# Patient Record
Sex: FEMALE | Race: WHITE | Employment: OTHER | URBAN - METROPOLITAN AREA
[De-identification: names, ages, dates, MRNs, and addresses within clinical notes are randomized per-mention and may not be internally consistent; named-entity substitution may affect disease eponyms.]

---

## 2020-07-08 ENCOUNTER — VIRTUAL VISIT (OUTPATIENT)
Dept: ENDOCRINOLOGY | Age: 65
End: 2020-07-08

## 2020-07-08 DIAGNOSIS — R53.82 CHRONIC FATIGUE: Primary | ICD-10-CM

## 2020-07-08 DIAGNOSIS — L68.0 HIRSUTISM: ICD-10-CM

## 2020-07-08 RX ORDER — MELOXICAM 15 MG/1
TABLET ORAL
COMMUNITY
Start: 2020-05-28

## 2020-07-08 RX ORDER — VENLAFAXINE HYDROCHLORIDE 75 MG/1
CAPSULE, EXTENDED RELEASE ORAL
COMMUNITY
Start: 2020-05-21

## 2020-07-08 RX ORDER — OMEPRAZOLE 40 MG/1
CAPSULE, DELAYED RELEASE ORAL
COMMUNITY
Start: 2020-05-28

## 2020-07-08 RX ORDER — ACETAMINOPHEN 500 MG
4000 TABLET ORAL DAILY
COMMUNITY

## 2020-07-08 RX ORDER — FAMOTIDINE 20 MG/1
TABLET, FILM COATED ORAL
COMMUNITY
Start: 2020-05-26

## 2020-07-08 NOTE — PROGRESS NOTES
**DUE TO PANDEMIC AND HEALTH CONCERNS IN THE COMMUNITY, THIS PATIENT WAS EITHER ILL OR FOUND TO BE HIGH RISK FOR IN-PERSON EVALUATION WITHIN THE CLINIC. THE FOLLOWING IS A VIRTUAL TELEMEDICINE VIDEO ENCOUNTER VIA [employer], TO WHICH THE PATIENT AGREED. THE PURPOSE IS TO LIMIT INTERRUPTIONS IN HEALTHCARE AND TO PROVIDE FOR ONGOING URGENT NEEDS UNDER THE CURRENT CONDITIONS. CONSULTATION REQUESTED BY: Self Referred, PCP is  Arianna Montero MD     REASON FOR CONSULT: thyroid evaluation, hirutism    CHIEF COMPLAINT: Evaluation for hypothyroidism, hirsutism    HISTORY OF PRESENT ILLNESS:   Zakia Couch is a 59 y.o. female with a PMHx as noted below who was referred to our endocrinology clinic for evaluation for possible hypothyroidism and hirsutism. Thyroid:   Hx of chronic fatigue x 9 months, not improving,  Weight gain, reporting 0 pounds in the past 6 months,  Denies any dysphagia,   She is concerned of possible underlying thyroid dysfunction,  Family history: Mother was on thyroid med for a number of years,  Thyroid level checked a couple times reporting stable findings,   No records, No prior thyroid ultrasounds,    Hirsutism:   Present for about 10 years,   Reporting face/chin/around nipples/naval  Nipple and naval hair improving,   Patient has been plucking mostly, rarely shaving,   Not much of a problem on the arms,  Post menopausal, last menses estimated about 5 years ago,  Denies any hormone replacement therapy following menopause,  Hx of progestin use for a number of years prior to that however,   No major GYN surgeries but she did have her \"tubes tied\",      PAST MEDICAL/SURGICAL HISTORY:   No past medical history on file.   Past Surgical History:   Procedure Laterality Date    HX APPENDECTOMY      HX GYN      tubal ligation       ALLERGIES:   Allergies   Allergen Reactions    Ampicillin Itching    Lodine [Etodolac] Itching       MEDICATIONS ON ADMISSION:     Current Outpatient Medications:    meloxicam (MOBIC) 15 mg tablet, TK 1 T PO ONCE A DAY FOR KNEE PAIN, Disp: , Rfl:     omeprazole (PRILOSEC) 40 mg capsule, TK ONE C PO ONCE A DAY 30 MINUTES BEFORE MORNING MEAL WITH MELOXICAM, Disp: , Rfl:     venlafaxine-SR (EFFEXOR-XR) 75 mg capsule, Taking 2 caps in the PM, Disp: , Rfl:     cyanocobalamin, vitamin B-12, (VITAMIN B-12 PO), Take  by mouth., Disp: , Rfl:     cholecalciferol (VITAMIN D3) (2,000 UNITS /50 MCG) cap capsule, Take 4,000 Units by mouth daily. , Disp: , Rfl:     famotidine (PEPCID) 20 mg tablet, TK 2 TS PO HS, Disp: , Rfl:     zolpidem (AMBIEN) 5 mg tablet, Take 5 mg by mouth nightly as needed for Sleep., Disp: , Rfl:     SOCIAL HISTORY:   Social History     Socioeconomic History    Marital status:      Spouse name: Not on file    Number of children: Not on file    Years of education: Not on file    Highest education level: Not on file   Occupational History    Not on file   Social Needs    Financial resource strain: Not on file    Food insecurity     Worry: Not on file     Inability: Not on file    Transportation needs     Medical: Not on file     Non-medical: Not on file   Tobacco Use    Smoking status: Former Smoker   Substance and Sexual Activity    Alcohol use: Yes     Comment: social    Drug use: Not on file    Sexual activity: Not on file   Lifestyle    Physical activity     Days per week: Not on file     Minutes per session: Not on file    Stress: Not on file   Relationships    Social connections     Talks on phone: Not on file     Gets together: Not on file     Attends Shinto service: Not on file     Active member of club or organization: Not on file     Attends meetings of clubs or organizations: Not on file     Relationship status: Not on file    Intimate partner violence     Fear of current or ex partner: Not on file     Emotionally abused: Not on file     Physically abused: Not on file     Forced sexual activity: Not on file   Other Topics Concern  Not on file   Social History Narrative    Not on file       FAMILY HISTORY:  Family History   Problem Relation Age of Onset    Hypertension Mother     Migraines Mother     Diabetes Father     Stroke Father     Alcohol abuse Brother     Cancer Brother     Cancer Maternal Aunt     Cancer Maternal Uncle     Cancer Paternal Aunt     Alcohol abuse Maternal Grandfather        REVIEW OF SYSTEMS: Complete ROS assessed and noted for that which is described above, all else are negative. Eyes: normal  ENT: normal  CVS: normal  Resp: normal  GI: normal  : normal  GYN: normal  Endocrine: normal  Integument: normal  Musculoskeletal: normal  Neuro: normal  Psych: normal      PHYSICAL EXAMINATION:  Telemedicine Visit    GENERAL: NCAT, Appears well nourished  EYES: EOMI, non-icteric, no proptosis  Ear/Nose/Throat: NCAT, no visible inflammation or masses  CARDIOVASCULAR: no cyanosis, no visible JVD  RESPIRATORY: comfortable respirations observed, no cyanosis  MUSCULOSKELETAL: Normal ROM of upper extremities observed  SKIN: No edema, rash, or other significant changes observed  NEUROLOGIC:  AAOx3  PSYCHIATRIC: Normal affect, Normal insight and judgement    REVIEW OF LABORATORY AND RADIOLOGY DATA:   Labs and documentation have been reviewed as described above. ASSESSMENT AND PLAN:   Danyelle Esparza is a 59 y.o. female with a PMHx as noted above who was referred to our endocrinology clinic for evaluation for possible hypothyroidism and hirsutism. Evaluation for possible hypothyroidism / chronic fatigue  Hirsutism     Thyroid Health: There is a history of thyroid disorder in her mother for which she was treated, and so obtaining a thyroid panel together with thyroid autoantibodies is reasonable to assess her status and risk/prognosis. Ordered. Hirsutism: It is reassuring that some of the \"problem hair\" around the naval and nipples have thinned on their own and improved.  We will check her female hormone profile including a testosterone level to assure that there is not an over-abundance contributing to this. I did advise her that moderate thick hair on the face and certain areas can be a normal variation however and may not reflect any underlying disease process. Will advise of results. RTC: Based on findings    45 minutes spent toward telemedicine visit today of which >50% of this time was spent in counseling and coordination of care. Siri Danielson.  [address] [facility]

## 2020-07-21 LAB
ALBUMIN SERPL-MCNC: 4.7 G/DL (ref 3.8–4.8)
ALBUMIN/GLOB SERPL: 1.8 {RATIO} (ref 1.2–2.2)
ALP SERPL-CCNC: 115 IU/L (ref 39–117)
ALT SERPL-CCNC: 13 IU/L (ref 0–32)
AST SERPL-CCNC: 17 IU/L (ref 0–40)
BILIRUB SERPL-MCNC: 0.4 MG/DL (ref 0–1.2)
BUN SERPL-MCNC: 18 MG/DL (ref 8–27)
BUN/CREAT SERPL: 16 (ref 12–28)
CALCIUM SERPL-MCNC: 10.3 MG/DL (ref 8.7–10.3)
CHLORIDE SERPL-SCNC: 103 MMOL/L (ref 96–106)
CO2 SERPL-SCNC: 28 MMOL/L (ref 20–29)
CREAT SERPL-MCNC: 1.1 MG/DL (ref 0.57–1)
ESTRADIOL SERPL-MCNC: 9 PG/ML
FSH SERPL-ACNC: 63.8 MIU/ML
GLOBULIN SER CALC-MCNC: 2.6 G/DL (ref 1.5–4.5)
GLUCOSE SERPL-MCNC: 104 MG/DL (ref 65–99)
INTERPRETATION: NORMAL
LH SERPL-ACNC: 30.7 MIU/ML
POTASSIUM SERPL-SCNC: 4.8 MMOL/L (ref 3.5–5.2)
PROT SERPL-MCNC: 7.3 G/DL (ref 6–8.5)
SODIUM SERPL-SCNC: 144 MMOL/L (ref 134–144)
T4 FREE SERPL-MCNC: 0.99 NG/DL (ref 0.82–1.77)
TESTOST SERPL-MCNC: 39.9 NG/DL (ref 7–40)
THYROGLOB AB SERPL-ACNC: <1 IU/ML (ref 0–0.9)
THYROPEROXIDASE AB SERPL-ACNC: 14 IU/ML (ref 0–34)
TSH SERPL DL<=0.005 MIU/L-ACNC: 2.36 UIU/ML (ref 0.45–4.5)

## 2020-07-22 ENCOUNTER — TELEPHONE (OUTPATIENT)
Dept: ENDOCRINOLOGY | Age: 65
End: 2020-07-22

## 2020-07-22 NOTE — TELEPHONE ENCOUNTER
----- Message from Fredis Norris MD sent at 7/22/2020  3:41 PM EDT -----  Brendon Pires,   All of the patients thyroid levels are normal, with negative thyroid antibodies which is reassuring. Her female hormone panel is as expected for menopause without elevated testosterone. She will not need to follow up in the clinic,     Thanks,   Asha Garrett.  [address] [facility]  [address] [place] [address]

## 2020-07-22 NOTE — TELEPHONE ENCOUNTER
I attempted to call Ms. Elizabeth Habermann and reached a voice mail. I left a message asking her to give me a call back.   Jony Carcamo

## 2020-07-22 NOTE — PROGRESS NOTES
Monalisa Carlos,   All of the patients thyroid levels are normal, with negative thyroid antibodies which is reassuring. Her female hormone panel is as expected for menopause without elevated testosterone. She will not need to follow up in the clinic,     Thanks,   Adrienne Marino.  [address] [facility]  [address] [place] [address]

## 2020-07-24 NOTE — TELEPHONE ENCOUNTER
I attempted to call Ms. Vickie Beasley again and again reached her voice mail. I left a message relaying the information from Dr. Cherri Hightower and said to give me a call back if she had any further questions.   Sharri Baker 3/2017 sinus arrhythmia

## 2021-12-15 NOTE — PROGRESS NOTES
HPI: Flower Curry (: 1955) is a 77 y.o. female, patient, here for evaluation of the following chief complaint(s): Patient presents with complaint of clicking and locking of the right middle finger. This has been going on for 6 to 7 months. This is awakening her. No injury/trauma. She is interested in a release versus going through the process of injection. No other complaints or concerns. Finger Pain (Right middle)       Vitals:  Ht 5' 4\" (1.626 m)   Wt 220 lb (99.8 kg)   BMI 37.76 kg/m²    Body mass index is 37.76 kg/m². Allergies   Allergen Reactions    Ampicillin Itching    Band Aids [Adhesive] Itching    Lodine [Etodolac] Itching       Current Outpatient Medications   Medication Sig    methylPREDNISolone (MEDROL DOSEPACK) 4 mg tablet Per dose pack instructions    meloxicam (MOBIC) 15 mg tablet TK 1 T PO ONCE A DAY FOR KNEE PAIN    omeprazole (PRILOSEC) 40 mg capsule TK ONE C PO ONCE A DAY 30 MINUTES BEFORE MORNING MEAL WITH MELOXICAM    venlafaxine-SR (EFFEXOR-XR) 75 mg capsule Taking 2 caps in the PM    cyanocobalamin, vitamin B-12, (VITAMIN B-12 PO) Take  by mouth.  cholecalciferol (VITAMIN D3) (2,000 UNITS /50 MCG) cap capsule Take 4,000 Units by mouth daily.  famotidine (PEPCID) 20 mg tablet TK 2 TS PO HS (Patient not taking: Reported on 2021)    zolpidem (AMBIEN) 5 mg tablet Take 5 mg by mouth nightly as needed for Sleep. (Patient not taking: Reported on 2021)     No current facility-administered medications for this visit. No past medical history on file.      Past Surgical History:   Procedure Laterality Date    HX APPENDECTOMY      HX GYN      tubal ligation       Family History   Problem Relation Age of Onset    Hypertension Mother    [facility] Migraines Mother     Diabetes Father     Stroke Father     Alcohol abuse Brother     Cancer Brother     Cancer Maternal Aunt     Cancer Maternal Uncle     Cancer Paternal Aunt     Alcohol abuse Maternal Grandfather         Social History     Tobacco Use    Smoking status: Former Smoker    Smokeless tobacco: Not on file   Substance Use Topics    Alcohol use: Yes     Comment: social    Drug use: Not on file        Review of Systems    Constitutional: No fevers, chills, night sweats, excessive fatigue or weight loss. Musculoskeletal: No joint pain, swelling or redness. No decreased range of motion. Neurologic: No headache, blurred vision, and no areas of focal weakness or numbness. Normal gait. No sensory problems. Respiratory: No dyspnea on exertion, orthopnea, chest pain, cough or hemoptysis. Cardiovascular: No anginal chest pain, irregular heart beat, tachycardia, palpitations or orthopnea  Integumentary: No chronic rashes, inflammation, ulcerations, pruritus, petechiae, purpura, ecchymoses, or skin changes           Physical Exam    General: Alert, cooperative, no distress  Musculosketal: Right hand - Tenderness to palpation at the A1 pulley of the right middle finger. Palpable clicking. No reproducible locking. No ulnar subluxation of the extensor tendon appreciated. Neurologic:  CNII-XII intact, Normal strength, sensation, and reflexes throughout    Imaging:  None     ASSESSMENT/PLAN:  Below is the assessment and plan developed based on review of pertinent history, physical exam, labs, studies, and medications. Clicking and locking of the right middle finger. This has been going on for 6 to 7 months. This is awakening her. No injury/trauma. She is interested in a release versus going through the process of injection. Clinical exam is consistent with stenosing tenosynovitis of the right middle finger. Treatment options discussed with the patient. She is deferring injections and would like to schedule a trigger finger release. In the meantime, she would like to try a steroid for symptom relief.   I reviewed risks that include but are not limited to stiffness, pain, nerve or tendon damage and overall incomplete relief of pain. Arrangements can be made for this to be performed on an outpatient basis at her convenience. 1. Acquired trigger finger of right middle finger  -     methylPREDNISolone (MEDROL DOSEPACK) 4 mg tablet; Per dose pack instructions, Normal, Disp-1 Dose Pack, R-0  2. Finger pain, right      Return in about 4 weeks (around 1/13/2022). Dr. Evy Ardon was available for immediate consult during this encounter. An electronic signature was used to authenticate this note.   -- Donald Ricks PA-C

## 2021-12-16 ENCOUNTER — OFFICE VISIT (OUTPATIENT)
Dept: ORTHOPEDIC SURGERY | Age: 66
End: 2021-12-16
Payer: MEDICARE

## 2021-12-16 VITALS — BODY MASS INDEX: 37.56 KG/M2 | HEIGHT: 64 IN | WEIGHT: 220 LBS

## 2021-12-16 DIAGNOSIS — M79.644 FINGER PAIN, RIGHT: ICD-10-CM

## 2021-12-16 DIAGNOSIS — M65.331 ACQUIRED TRIGGER FINGER OF RIGHT MIDDLE FINGER: Primary | ICD-10-CM

## 2021-12-16 PROCEDURE — G8536 NO DOC ELDER MAL SCRN: HCPCS | Performed by: PHYSICIAN ASSISTANT

## 2021-12-16 PROCEDURE — G8417 CALC BMI ABV UP PARAM F/U: HCPCS | Performed by: PHYSICIAN ASSISTANT

## 2021-12-16 PROCEDURE — 3017F COLORECTAL CA SCREEN DOC REV: CPT | Performed by: PHYSICIAN ASSISTANT

## 2021-12-16 PROCEDURE — G8427 DOCREV CUR MEDS BY ELIG CLIN: HCPCS | Performed by: PHYSICIAN ASSISTANT

## 2021-12-16 PROCEDURE — 99203 OFFICE O/P NEW LOW 30 MIN: CPT | Performed by: PHYSICIAN ASSISTANT

## 2021-12-16 PROCEDURE — G8432 DEP SCR NOT DOC, RNG: HCPCS | Performed by: PHYSICIAN ASSISTANT

## 2021-12-16 PROCEDURE — G8400 PT W/DXA NO RESULTS DOC: HCPCS | Performed by: PHYSICIAN ASSISTANT

## 2021-12-16 PROCEDURE — 1090F PRES/ABSN URINE INCON ASSESS: CPT | Performed by: PHYSICIAN ASSISTANT

## 2021-12-16 PROCEDURE — 1101F PT FALLS ASSESS-DOCD LE1/YR: CPT | Performed by: PHYSICIAN ASSISTANT

## 2021-12-16 RX ORDER — METHYLPREDNISOLONE 4 MG/1
TABLET ORAL
Qty: 1 DOSE PACK | Refills: 0 | Status: SHIPPED | OUTPATIENT
Start: 2021-12-16

## 2021-12-21 DIAGNOSIS — M65.331 ACQUIRED TRIGGER FINGER OF RIGHT MIDDLE FINGER: Primary | ICD-10-CM

## 2021-12-23 DIAGNOSIS — M65.331 ACQUIRED TRIGGER FINGER OF RIGHT MIDDLE FINGER: Primary | ICD-10-CM

## 2021-12-23 RX ORDER — HYDROCODONE BITARTRATE AND ACETAMINOPHEN 5; 325 MG/1; MG/1
1 TABLET ORAL
Qty: 15 TABLET | Refills: 0 | Status: SHIPPED | OUTPATIENT
Start: 2021-12-23 | End: 2021-12-26

## 2022-01-09 NOTE — PROGRESS NOTES
HPI: Tiffanie Yañez (: 1955) is a 77 y.o. female, patient, here for evaluation of the following chief complaint(s): Patient presents with complaint of clicking and locking of the right middle finger. This has been going on for 6 to 7 months. She underwent a right middle trigger finger A1 pulley release with left ring trigger finger injection on 2021. No chief complaint on file. Vitals: There were no vitals taken for this visit. There is no height or weight on file to calculate BMI. Allergies   Allergen Reactions    Ampicillin Itching    Band Aids [Adhesive] Itching    Lodine [Etodolac] Itching       Current Outpatient Medications   Medication Sig    methylPREDNISolone (MEDROL DOSEPACK) 4 mg tablet Per dose pack instructions    famotidine (PEPCID) 20 mg tablet TK 2 TS PO HS (Patient not taking: Reported on 2021)    meloxicam (MOBIC) 15 mg tablet TK 1 T PO ONCE A DAY FOR KNEE PAIN    omeprazole (PRILOSEC) 40 mg capsule TK ONE C PO ONCE A DAY 30 MINUTES BEFORE MORNING MEAL WITH MELOXICAM    venlafaxine-SR (EFFEXOR-XR) 75 mg capsule Taking 2 caps in the PM    cyanocobalamin, vitamin B-12, (VITAMIN B-12 PO) Take  by mouth.  cholecalciferol (VITAMIN D3) (2,000 UNITS /50 MCG) cap capsule Take 4,000 Units by mouth daily.  zolpidem (AMBIEN) 5 mg tablet Take 5 mg by mouth nightly as needed for Sleep. (Patient not taking: Reported on 2021)     No current facility-administered medications for this visit. No past medical history on file.      Past Surgical History:   Procedure Laterality Date    HX APPENDECTOMY      HX GYN      tubal ligation       Family History   Problem Relation Age of Onset    Hypertension Mother    Robertha Rumps Migraines Mother     Diabetes Father     Stroke Father     Alcohol abuse Brother     Cancer Brother     Cancer Maternal Aunt     Cancer Maternal Uncle     Cancer Paternal Aunt     Alcohol abuse Maternal Grandfather         Social History     Tobacco Use    Smoking status: Former Smoker    Smokeless tobacco: Not on file   Substance Use Topics    Alcohol use: Yes     Comment: social    Drug use: Not on file        Review of Systems   All other systems reviewed and are negative. Constitutional: No fevers, chills, night sweats, excessive fatigue or weight loss. Musculoskeletal: No joint pain, swelling or redness. No decreased range of motion. Neurologic: No headache, blurred vision, and no areas of focal weakness or numbness. Normal gait. No sensory problems. Respiratory: No dyspnea on exertion, orthopnea, chest pain, cough or hemoptysis. Cardiovascular: No anginal chest pain, irregular heart beat, tachycardia, palpitations or orthopnea  Integumentary: No chronic rashes, inflammation, ulcerations, pruritus, petechiae, purpura, ecchymoses, or skin changes           Physical Exam    Overall the right hand wound is healing well. There is no redness drainage or sign of infection. Good early digital motion. No locking in either the right middle finger of the left ring finger. Imaging:  None     ASSESSMENT/PLAN:  Below is the assessment and plan developed based on review of pertinent history, physical exam, labs, studies, and medications. Clicking and locking of the right middle finger. This has been going on for 6 to 7 months. This is awakening her. No injury/trauma. She is interested in a release versus going through the process of injection. Clinical exam is consistent with stenosing tenosynovitis of the right middle finger. Treatment options discussed with the patient. She is deferring injections and would like to schedule a trigger finger release. In the meantime, she would like to try a steroid for symptom relief. She underwent a right middle trigger finger A1 pulley release with left ring finger corticosteroid injection for stenosing tenosynovitis on 12/27/2021.   She may continue with simple wound care, gentle motion and strength. Follow-up in 3 to 4 weeks. 1. Acquired trigger finger of right middle finger  2. Finger pain, right  3. Trigger ring finger of left hand  4. Status post trigger finger release      No follow-ups on file.

## 2022-01-10 ENCOUNTER — OFFICE VISIT (OUTPATIENT)
Dept: ORTHOPEDIC SURGERY | Age: 67
End: 2022-01-10
Payer: MEDICARE

## 2022-01-10 VITALS — BODY MASS INDEX: 37.56 KG/M2 | WEIGHT: 220 LBS | HEIGHT: 64 IN

## 2022-01-10 DIAGNOSIS — M79.644 FINGER PAIN, RIGHT: ICD-10-CM

## 2022-01-10 DIAGNOSIS — M65.342 TRIGGER RING FINGER OF LEFT HAND: ICD-10-CM

## 2022-01-10 DIAGNOSIS — Z98.890 STATUS POST TRIGGER FINGER RELEASE: ICD-10-CM

## 2022-01-10 DIAGNOSIS — M65.331 ACQUIRED TRIGGER FINGER OF RIGHT MIDDLE FINGER: Primary | ICD-10-CM

## 2022-01-10 PROCEDURE — 99024 POSTOP FOLLOW-UP VISIT: CPT | Performed by: ORTHOPAEDIC SURGERY

## 2022-03-18 PROBLEM — M65.331 ACQUIRED TRIGGER FINGER OF RIGHT MIDDLE FINGER: Status: ACTIVE | Noted: 2021-12-16

## 2022-03-19 PROBLEM — M79.644 FINGER PAIN, RIGHT: Status: ACTIVE | Noted: 2021-12-16

## 2023-05-17 RX ORDER — MELOXICAM 15 MG/1
TABLET ORAL
COMMUNITY
Start: 2020-05-28

## 2023-05-17 RX ORDER — OMEPRAZOLE 40 MG/1
CAPSULE, DELAYED RELEASE ORAL
COMMUNITY
Start: 2020-05-28

## 2023-05-17 RX ORDER — ZOLPIDEM TARTRATE 5 MG/1
5 TABLET ORAL NIGHTLY PRN
COMMUNITY

## 2023-05-17 RX ORDER — VENLAFAXINE HYDROCHLORIDE 75 MG/1
CAPSULE, EXTENDED RELEASE ORAL
COMMUNITY
Start: 2020-05-21

## 2023-05-17 RX ORDER — FAMOTIDINE 20 MG/1
TABLET, FILM COATED ORAL
COMMUNITY
Start: 2020-05-26

## 2023-05-17 RX ORDER — METHYLPREDNISOLONE 4 MG/1
TABLET ORAL
COMMUNITY
Start: 2021-12-16

## 2025-01-20 ENCOUNTER — OFFICE VISIT (OUTPATIENT)
Facility: CLINIC | Age: 70
End: 2025-01-20
Payer: MEDICARE

## 2025-01-20 VITALS
BODY MASS INDEX: 36.25 KG/M2 | HEART RATE: 90 BPM | SYSTOLIC BLOOD PRESSURE: 149 MMHG | WEIGHT: 212.3 LBS | RESPIRATION RATE: 14 BRPM | DIASTOLIC BLOOD PRESSURE: 91 MMHG | OXYGEN SATURATION: 95 % | HEIGHT: 64 IN

## 2025-01-20 DIAGNOSIS — R19.7 DIARRHEA OF PRESUMED INFECTIOUS ORIGIN: Primary | ICD-10-CM

## 2025-01-20 DIAGNOSIS — F32.A ANXIETY AND DEPRESSION: ICD-10-CM

## 2025-01-20 DIAGNOSIS — F41.9 ANXIETY AND DEPRESSION: ICD-10-CM

## 2025-01-20 PROBLEM — M79.644 FINGER PAIN, RIGHT: Status: RESOLVED | Noted: 2021-12-16 | Resolved: 2025-01-20

## 2025-01-20 PROCEDURE — 1126F AMNT PAIN NOTED NONE PRSNT: CPT | Performed by: STUDENT IN AN ORGANIZED HEALTH CARE EDUCATION/TRAINING PROGRAM

## 2025-01-20 PROCEDURE — 1123F ACP DISCUSS/DSCN MKR DOCD: CPT | Performed by: STUDENT IN AN ORGANIZED HEALTH CARE EDUCATION/TRAINING PROGRAM

## 2025-01-20 PROCEDURE — G8400 PT W/DXA NO RESULTS DOC: HCPCS | Performed by: STUDENT IN AN ORGANIZED HEALTH CARE EDUCATION/TRAINING PROGRAM

## 2025-01-20 PROCEDURE — G8427 DOCREV CUR MEDS BY ELIG CLIN: HCPCS | Performed by: STUDENT IN AN ORGANIZED HEALTH CARE EDUCATION/TRAINING PROGRAM

## 2025-01-20 PROCEDURE — 1090F PRES/ABSN URINE INCON ASSESS: CPT | Performed by: STUDENT IN AN ORGANIZED HEALTH CARE EDUCATION/TRAINING PROGRAM

## 2025-01-20 PROCEDURE — 3017F COLORECTAL CA SCREEN DOC REV: CPT | Performed by: STUDENT IN AN ORGANIZED HEALTH CARE EDUCATION/TRAINING PROGRAM

## 2025-01-20 PROCEDURE — 99204 OFFICE O/P NEW MOD 45 MIN: CPT | Performed by: STUDENT IN AN ORGANIZED HEALTH CARE EDUCATION/TRAINING PROGRAM

## 2025-01-20 PROCEDURE — 1036F TOBACCO NON-USER: CPT | Performed by: STUDENT IN AN ORGANIZED HEALTH CARE EDUCATION/TRAINING PROGRAM

## 2025-01-20 PROCEDURE — 1159F MED LIST DOCD IN RCRD: CPT | Performed by: STUDENT IN AN ORGANIZED HEALTH CARE EDUCATION/TRAINING PROGRAM

## 2025-01-20 PROCEDURE — G8417 CALC BMI ABV UP PARAM F/U: HCPCS | Performed by: STUDENT IN AN ORGANIZED HEALTH CARE EDUCATION/TRAINING PROGRAM

## 2025-01-20 PROCEDURE — 1160F RVW MEDS BY RX/DR IN RCRD: CPT | Performed by: STUDENT IN AN ORGANIZED HEALTH CARE EDUCATION/TRAINING PROGRAM

## 2025-01-20 RX ORDER — VANCOMYCIN HYDROCHLORIDE 125 MG/1
125 CAPSULE ORAL 4 TIMES DAILY
Qty: 40 CAPSULE | Refills: 0 | Status: SHIPPED | OUTPATIENT
Start: 2025-01-20 | End: 2025-01-30

## 2025-01-20 RX ORDER — BUPROPION HYDROCHLORIDE 300 MG/1
300 TABLET ORAL EVERY MORNING
COMMUNITY

## 2025-01-20 NOTE — PROGRESS NOTES
Xiomara Singletary a 69 y.o. female  has no past medical history on file. presents to office today to establish care.     Subjective:    Diarrhea,  Acute  - onset 16 days ago, watery stools, this morning it was mushy, occurs primarily after meals   - took imodium which made her nauseous  - notes that she visited the ER last Monday, discharged to home after workup   - associated symptoms: mild sweats with change in taste   - notes that she has no forewarning, notes that her stool has been foul smelling   - notes that she has lost 10 pounds in 16 days  - she had around 3 to 6 bowel movements in a day  -  feels that her stomach appears swollen   - no blood in stools     Anxiety and Depression   - History of anxiety and depression, patient was previously on Effexor for many years, she establish care with a new PCP and the medication was discontinued and she was started on Wellbutrin.  Patient states that she has not derived good benefit on this medication and would like to switch back to Effexor.     Lymphoma   -Patient states that she finished chemotherapy April 2024.  She notes that she continues to receive immunotherapy every other month.  - [facility] h/o [facility]          Health Maintenance   Topic Date Due    Depression Screen  Never done    Hepatitis C screen  Never done    DTaP/Tdap/Td vaccine (1 - Tdap) Never done    Diabetes screen  Never done    Lipids  Never done    Breast cancer screen  Never done    Shingles vaccine (1 of 2) Never done    DEXA (modify frequency per FRAX score)  Never done    Annual Wellness Visit (Medicare)  Never done    Flu vaccine (1) 08/01/2024    COVID-19 Vaccine (1 - 2023-24 season) Never done    Colorectal Cancer Screen  06/07/2026    Respiratory Syncytial Virus (RSV) Pregnant or age 60 yrs+ (1 - 1-dose 75+ series) 09/18/2030    Pneumococcal 65+ years Vaccine  Completed    Hepatitis A vaccine  Aged Out    Hepatitis B vaccine  Aged Out    Hib vaccine  Aged Out    Polio

## 2025-01-20 NOTE — ASSESSMENT & PLAN NOTE
Patient has been experiencing diarrhea for the past 16 days with a 10 pound weight loss in the setting of lymphoma history.  Concerns for possible C. difficile, will obtain sample.  Patient directed to follow-up with heme-onc as well for further recommendations.  Will obtain CBC to assess white blood cell count.  ER precaution reviewed.  Will empirically treat with oral vancomycin.  Follow-up in 1 month.

## 2025-01-20 NOTE — PATIENT INSTRUCTIONS
Taper and discontinue wellbutrin, restart Effexor    The following medication/s has been sent to your pharmacy:  vancomycin

## 2025-01-20 NOTE — PROGRESS NOTES
\"Have you been to the ER, urgent care clinic since your last visit?  Hospitalized since your last visit?\"    YES - When: approximately 1  weeks ago.  Where and Why: last week - 301; er .    “Have you seen or consulted any other health care providers outside our system since your last visit?”    NO    Have you had a mammogram?”   NO    No breast cancer screening on file

## 2025-01-20 NOTE — ASSESSMENT & PLAN NOTE
PHQ-9 in office today 15, moderately severe depression.  MELINDA-7 in office today 11, moderate anxiety.  Patient has not noticed an improvement of anxiety and depression while taking Wellbutrin, will discontinue medication.  Will restart Effexor.  Follow-up in 1 month.

## 2025-01-21 LAB
ANION GAP SERPL CALC-SCNC: 8 MMOL/L (ref 2–12)
BASOPHILS # BLD: 0.03 K/UL (ref 0–0.1)
BASOPHILS NFR BLD: 0.9 % (ref 0–1)
BUN SERPL-MCNC: 10 MG/DL (ref 6–20)
BUN/CREAT SERPL: 11 (ref 12–20)
CALCIUM SERPL-MCNC: 9.5 MG/DL (ref 8.5–10.1)
CHLORIDE SERPL-SCNC: 109 MMOL/L (ref 97–108)
CHOLEST SERPL-MCNC: 175 MG/DL
CO2 SERPL-SCNC: 24 MMOL/L (ref 21–32)
CREAT SERPL-MCNC: 0.95 MG/DL (ref 0.55–1.02)
DIFFERENTIAL METHOD BLD: ABNORMAL
EOSINOPHIL # BLD: 0.04 K/UL (ref 0–0.4)
EOSINOPHIL NFR BLD: 1.2 % (ref 0–7)
ERYTHROCYTE [DISTWIDTH] IN BLOOD BY AUTOMATED COUNT: 13.7 % (ref 11.5–14.5)
GLUCOSE SERPL-MCNC: 104 MG/DL (ref 65–100)
HCT VFR BLD AUTO: 40.1 % (ref 35–47)
HDLC SERPL-MCNC: 60 MG/DL
HDLC SERPL: 2.9 (ref 0–5)
HGB BLD-MCNC: 12.8 G/DL (ref 11.5–16)
IMM GRANULOCYTES # BLD AUTO: 0.06 K/UL (ref 0–0.04)
IMM GRANULOCYTES NFR BLD AUTO: 1.9 % (ref 0–0.5)
LDLC SERPL CALC-MCNC: 88.6 MG/DL (ref 0–100)
LYMPHOCYTES # BLD: 0.42 K/UL (ref 0.8–3.5)
LYMPHOCYTES NFR BLD: 13.1 % (ref 12–49)
MCH RBC QN AUTO: 27.7 PG (ref 26–34)
MCHC RBC AUTO-ENTMCNC: 31.9 G/DL (ref 30–36.5)
MCV RBC AUTO: 86.8 FL (ref 80–99)
MONOCYTES # BLD: 0.59 K/UL (ref 0–1)
MONOCYTES NFR BLD: 18.4 % (ref 5–13)
NEUTS SEG # BLD: 2.06 K/UL (ref 1.8–8)
NEUTS SEG NFR BLD: 64.5 % (ref 32–75)
NRBC # BLD: 0 K/UL (ref 0–0.01)
NRBC BLD-RTO: 0 PER 100 WBC
PLATELET # BLD AUTO: 331 K/UL (ref 150–400)
PMV BLD AUTO: 10.4 FL (ref 8.9–12.9)
POTASSIUM SERPL-SCNC: 3.4 MMOL/L (ref 3.5–5.1)
RBC # BLD AUTO: 4.62 M/UL (ref 3.8–5.2)
RBC MORPH BLD: ABNORMAL
RBC MORPH BLD: ABNORMAL
SODIUM SERPL-SCNC: 141 MMOL/L (ref 136–145)
TRIGL SERPL-MCNC: 132 MG/DL
VLDLC SERPL CALC-MCNC: 26.4 MG/DL
WBC # BLD AUTO: 3.2 K/UL (ref 3.6–11)

## 2025-01-22 DIAGNOSIS — R19.7 DIARRHEA OF PRESUMED INFECTIOUS ORIGIN: ICD-10-CM

## 2025-01-22 NOTE — RESULT ENCOUNTER NOTE
Please notify patient.  Low potassium thought to be related to her recent diarrhea symptoms. Will repeat during next office visit to ensure level has normalized, 2/17/25.  Awaiting stool sample results.   No change in current management/meds.

## 2025-01-23 LAB
C DIFF GDH STL QL: NEGATIVE
C DIFF TOX A+B STL QL IA: NEGATIVE
C DIFF TOXIN INTERPRETATION: NORMAL

## 2025-01-28 LAB
BACTERIA SPEC CULT: NORMAL
CAMPYLOBACTER STL CULT: ABNORMAL
E COLI SXT STL QL IA: NEGATIVE
SALMONELLA/SHIGELLA SCREEN: ABNORMAL
SPECIMEN SOURCE: ABNORMAL

## 2025-01-28 NOTE — RESULT ENCOUNTER NOTE
Please notify patient.  Preliminary stool culture result, negative for C. difficile  No change in current management/meds.

## 2025-02-17 ENCOUNTER — OFFICE VISIT (OUTPATIENT)
Facility: CLINIC | Age: 70
End: 2025-02-17
Payer: MEDICARE

## 2025-02-17 VITALS
SYSTOLIC BLOOD PRESSURE: 137 MMHG | DIASTOLIC BLOOD PRESSURE: 84 MMHG | TEMPERATURE: 98.1 F | OXYGEN SATURATION: 96 % | RESPIRATION RATE: 14 BRPM | HEART RATE: 83 BPM | WEIGHT: 209.4 LBS | HEIGHT: 64 IN | BODY MASS INDEX: 35.75 KG/M2

## 2025-02-17 DIAGNOSIS — R19.7 DIARRHEA OF PRESUMED INFECTIOUS ORIGIN: ICD-10-CM

## 2025-02-17 DIAGNOSIS — F32.A ANXIETY AND DEPRESSION: ICD-10-CM

## 2025-02-17 DIAGNOSIS — K21.9 GASTROESOPHAGEAL REFLUX DISEASE WITHOUT ESOPHAGITIS: ICD-10-CM

## 2025-02-17 DIAGNOSIS — R53.83 OTHER FATIGUE: Primary | ICD-10-CM

## 2025-02-17 DIAGNOSIS — F41.9 ANXIETY AND DEPRESSION: ICD-10-CM

## 2025-02-17 PROCEDURE — 1123F ACP DISCUSS/DSCN MKR DOCD: CPT | Performed by: STUDENT IN AN ORGANIZED HEALTH CARE EDUCATION/TRAINING PROGRAM

## 2025-02-17 PROCEDURE — G8427 DOCREV CUR MEDS BY ELIG CLIN: HCPCS | Performed by: STUDENT IN AN ORGANIZED HEALTH CARE EDUCATION/TRAINING PROGRAM

## 2025-02-17 PROCEDURE — G8400 PT W/DXA NO RESULTS DOC: HCPCS | Performed by: STUDENT IN AN ORGANIZED HEALTH CARE EDUCATION/TRAINING PROGRAM

## 2025-02-17 PROCEDURE — 1090F PRES/ABSN URINE INCON ASSESS: CPT | Performed by: STUDENT IN AN ORGANIZED HEALTH CARE EDUCATION/TRAINING PROGRAM

## 2025-02-17 PROCEDURE — 1036F TOBACCO NON-USER: CPT | Performed by: STUDENT IN AN ORGANIZED HEALTH CARE EDUCATION/TRAINING PROGRAM

## 2025-02-17 PROCEDURE — 1126F AMNT PAIN NOTED NONE PRSNT: CPT | Performed by: STUDENT IN AN ORGANIZED HEALTH CARE EDUCATION/TRAINING PROGRAM

## 2025-02-17 PROCEDURE — 3017F COLORECTAL CA SCREEN DOC REV: CPT | Performed by: STUDENT IN AN ORGANIZED HEALTH CARE EDUCATION/TRAINING PROGRAM

## 2025-02-17 PROCEDURE — 99214 OFFICE O/P EST MOD 30 MIN: CPT | Performed by: STUDENT IN AN ORGANIZED HEALTH CARE EDUCATION/TRAINING PROGRAM

## 2025-02-17 PROCEDURE — G8417 CALC BMI ABV UP PARAM F/U: HCPCS | Performed by: STUDENT IN AN ORGANIZED HEALTH CARE EDUCATION/TRAINING PROGRAM

## 2025-02-17 ASSESSMENT — ANXIETY QUESTIONNAIRES
2. NOT BEING ABLE TO STOP OR CONTROL WORRYING: NOT AT ALL
6. BECOMING EASILY ANNOYED OR IRRITABLE: NOT AT ALL
1. FEELING NERVOUS, ANXIOUS, OR ON EDGE: SEVERAL DAYS
4. TROUBLE RELAXING: NOT AT ALL
GAD7 TOTAL SCORE: 1
7. FEELING AFRAID AS IF SOMETHING AWFUL MIGHT HAPPEN: NOT AT ALL
5. BEING SO RESTLESS THAT IT IS HARD TO SIT STILL: NOT AT ALL
IF YOU CHECKED OFF ANY PROBLEMS ON THIS QUESTIONNAIRE, HOW DIFFICULT HAVE THESE PROBLEMS MADE IT FOR YOU TO DO YOUR WORK, TAKE CARE OF THINGS AT HOME, OR GET ALONG WITH OTHER PEOPLE: NOT DIFFICULT AT ALL
3. WORRYING TOO MUCH ABOUT DIFFERENT THINGS: NOT AT ALL

## 2025-02-17 ASSESSMENT — PATIENT HEALTH QUESTIONNAIRE - PHQ9
3. TROUBLE FALLING OR STAYING ASLEEP: SEVERAL DAYS
6. FEELING BAD ABOUT YOURSELF - OR THAT YOU ARE A FAILURE OR HAVE LET YOURSELF OR YOUR FAMILY DOWN: NOT AT ALL
SUM OF ALL RESPONSES TO PHQ QUESTIONS 1-9: 7
7. TROUBLE CONCENTRATING ON THINGS, SUCH AS READING THE NEWSPAPER OR WATCHING TELEVISION: NOT AT ALL
10. IF YOU CHECKED OFF ANY PROBLEMS, HOW DIFFICULT HAVE THESE PROBLEMS MADE IT FOR YOU TO DO YOUR WORK, TAKE CARE OF THINGS AT HOME, OR GET ALONG WITH OTHER PEOPLE: SOMEWHAT DIFFICULT
9. THOUGHTS THAT YOU WOULD BE BETTER OFF DEAD, OR OF HURTING YOURSELF: NOT AT ALL
5. POOR APPETITE OR OVEREATING: MORE THAN HALF THE DAYS
8. MOVING OR SPEAKING SO SLOWLY THAT OTHER PEOPLE COULD HAVE NOTICED. OR THE OPPOSITE, BEING SO FIGETY OR RESTLESS THAT YOU HAVE BEEN MOVING AROUND A LOT MORE THAN USUAL: NOT AT ALL
1. LITTLE INTEREST OR PLEASURE IN DOING THINGS: MORE THAN HALF THE DAYS
SUM OF ALL RESPONSES TO PHQ QUESTIONS 1-9: 7
SUM OF ALL RESPONSES TO PHQ QUESTIONS 1-9: 7
2. FEELING DOWN, DEPRESSED OR HOPELESS: SEVERAL DAYS
SUM OF ALL RESPONSES TO PHQ QUESTIONS 1-9: 7
SUM OF ALL RESPONSES TO PHQ9 QUESTIONS 1 & 2: 3
4. FEELING TIRED OR HAVING LITTLE ENERGY: SEVERAL DAYS

## 2025-02-17 NOTE — PATIENT INSTRUCTIONS
Complete sleep study test    Restart Vitamin D tablet    You have been referred to: GI, Dr. Pickett  **Please allow up to 2 weeks for their office to call you and schedule. If you have not heard from them beyond 2 weeks, contact their office directly to schedule an appointment.      Continue Effexor 150mg    For your heartburn symptoms, continue omeprazole once daily, take famotidine daily as needed

## 2025-02-18 PROBLEM — R53.83 OTHER FATIGUE: Status: ACTIVE | Noted: 2025-02-18

## 2025-02-18 PROBLEM — K21.9 GASTROESOPHAGEAL REFLUX DISEASE WITHOUT ESOPHAGITIS: Status: ACTIVE | Noted: 2025-02-18

## 2025-02-18 NOTE — ASSESSMENT & PLAN NOTE
Increased acid reflux symptoms in the setting of known hiatal hernia.  Will refer to GI to better determine need for EGD.  Continue omeprazole daily, patient directed to start taking famotidine as needed.

## 2025-02-18 NOTE — PROGRESS NOTES
Xiomara Singletary a 69 y.o. female  has no past medical history on file. presents to office today for diarrhea follow up.     Subjective:    Diarrhea  -Patient reports that overall her diarrhea symptoms have improved, she notes that 3 to 4 days ago her symptoms lessened.  During last office visit, concerns for possible C. difficile, she completed a stool sample which was negative.  She was empirically started on vancomycin, and stopped the antibiotic course early.    Anxiety  -Patient reports that since discontinuing Wellbutrin and starting the Effexor overall her mood has been better.  Patient reports that she is tolerating Effexor well without side effects, she feels that her current dose is appropriate.    Fatigue  -Patient complains of increased fatigue.  She notes that she feels tired all of the time and has little energy.  Patient notes that she wakes up and does not feel like doing anything.  She notes that her symptoms are associated with mild weakness.  Patient reports that she was supposed to complete her sleep study test but has not done so yet.  -Patient reports that she has a history of vitamin D deficiency, she states that she usually takes 2 capsules by mouth daily but stopped a while ago.  -Patient reports that she does not exercise    Acid reflux  -Patient complains of increased acid reflux symptoms.  She notes that there is a known history of hiatal hernia.  Patient states that she has been taking omeprazole nightly.  She notes that she does not take her famotidine as needed.  Patient states that she has had increased sour taste in her mouth associated with diminished appetite.  Patient reports that she completed her colon cancer screening within the last 10 years through [facility].  She does not follow with a GI specialist.          Health Maintenance   Topic Date Due    Hepatitis C screen  Never done    DTaP/Tdap/Td vaccine (1 - Tdap) Never done    Diabetes screen  Never done    Breast cancer screen

## 2025-02-18 NOTE — ASSESSMENT & PLAN NOTE
Increased fatigue, sleep medicine referral previously placed, patient still needs to schedule.  Patient directed to restart her vitamin D capsules as this may also be contributing to her symptoms.  Patient currently not exercising, encouraged her to restart her home exercise routine, as this can help with her energy levels as well.

## 2025-02-18 NOTE — ASSESSMENT & PLAN NOTE
PHQ-9 in office today 7, mild depression, score improved since last office visit, 15.  Continue Effexor.

## 2025-06-03 RX ORDER — OMEPRAZOLE 40 MG/1
40 CAPSULE, DELAYED RELEASE ORAL DAILY
Qty: 90 CAPSULE | Refills: 1 | Status: SHIPPED | OUTPATIENT
Start: 2025-06-03

## 2025-08-15 ENCOUNTER — OFFICE VISIT (OUTPATIENT)
Facility: CLINIC | Age: 70
End: 2025-08-15
Payer: MEDICARE

## 2025-08-15 VITALS
OXYGEN SATURATION: 94 % | BODY MASS INDEX: 37.9 KG/M2 | SYSTOLIC BLOOD PRESSURE: 126 MMHG | HEART RATE: 87 BPM | DIASTOLIC BLOOD PRESSURE: 77 MMHG | TEMPERATURE: 97.9 F | WEIGHT: 222 LBS | RESPIRATION RATE: 16 BRPM | HEIGHT: 64 IN

## 2025-08-15 DIAGNOSIS — F41.9 ANXIETY AND DEPRESSION: ICD-10-CM

## 2025-08-15 DIAGNOSIS — R53.83 OTHER FATIGUE: ICD-10-CM

## 2025-08-15 DIAGNOSIS — M17.4 OTHER SECONDARY OSTEOARTHRITIS OF BOTH KNEES: ICD-10-CM

## 2025-08-15 DIAGNOSIS — F32.A ANXIETY AND DEPRESSION: ICD-10-CM

## 2025-08-15 DIAGNOSIS — E66.9 OBESITY (BMI 30-39.9): Primary | ICD-10-CM

## 2025-08-15 PROBLEM — M19.90 DJD (DEGENERATIVE JOINT DISEASE): Status: ACTIVE | Noted: 2025-08-15

## 2025-08-15 PROCEDURE — 1090F PRES/ABSN URINE INCON ASSESS: CPT | Performed by: STUDENT IN AN ORGANIZED HEALTH CARE EDUCATION/TRAINING PROGRAM

## 2025-08-15 PROCEDURE — 1036F TOBACCO NON-USER: CPT | Performed by: STUDENT IN AN ORGANIZED HEALTH CARE EDUCATION/TRAINING PROGRAM

## 2025-08-15 PROCEDURE — 1159F MED LIST DOCD IN RCRD: CPT | Performed by: STUDENT IN AN ORGANIZED HEALTH CARE EDUCATION/TRAINING PROGRAM

## 2025-08-15 PROCEDURE — G8400 PT W/DXA NO RESULTS DOC: HCPCS | Performed by: STUDENT IN AN ORGANIZED HEALTH CARE EDUCATION/TRAINING PROGRAM

## 2025-08-15 PROCEDURE — 1123F ACP DISCUSS/DSCN MKR DOCD: CPT | Performed by: STUDENT IN AN ORGANIZED HEALTH CARE EDUCATION/TRAINING PROGRAM

## 2025-08-15 PROCEDURE — G8417 CALC BMI ABV UP PARAM F/U: HCPCS | Performed by: STUDENT IN AN ORGANIZED HEALTH CARE EDUCATION/TRAINING PROGRAM

## 2025-08-15 PROCEDURE — 1160F RVW MEDS BY RX/DR IN RCRD: CPT | Performed by: STUDENT IN AN ORGANIZED HEALTH CARE EDUCATION/TRAINING PROGRAM

## 2025-08-15 PROCEDURE — 1126F AMNT PAIN NOTED NONE PRSNT: CPT | Performed by: STUDENT IN AN ORGANIZED HEALTH CARE EDUCATION/TRAINING PROGRAM

## 2025-08-15 PROCEDURE — 3017F COLORECTAL CA SCREEN DOC REV: CPT | Performed by: STUDENT IN AN ORGANIZED HEALTH CARE EDUCATION/TRAINING PROGRAM

## 2025-08-15 PROCEDURE — 99214 OFFICE O/P EST MOD 30 MIN: CPT | Performed by: STUDENT IN AN ORGANIZED HEALTH CARE EDUCATION/TRAINING PROGRAM

## 2025-08-15 PROCEDURE — G8427 DOCREV CUR MEDS BY ELIG CLIN: HCPCS | Performed by: STUDENT IN AN ORGANIZED HEALTH CARE EDUCATION/TRAINING PROGRAM

## 2025-08-15 SDOH — ECONOMIC STABILITY: FOOD INSECURITY: WITHIN THE PAST 12 MONTHS, YOU WORRIED THAT YOUR FOOD WOULD RUN OUT BEFORE YOU GOT MONEY TO BUY MORE.: NEVER TRUE

## 2025-08-15 SDOH — ECONOMIC STABILITY: FOOD INSECURITY: WITHIN THE PAST 12 MONTHS, THE FOOD YOU BOUGHT JUST DIDN'T LAST AND YOU DIDN'T HAVE MONEY TO GET MORE.: NEVER TRUE

## 2025-08-27 ENCOUNTER — TELEPHONE (OUTPATIENT)
Facility: CLINIC | Age: 70
End: 2025-08-27

## 2025-09-05 ENCOUNTER — COMMUNITY OUTREACH (OUTPATIENT)
Facility: CLINIC | Age: 70
End: 2025-09-05

## 2025-09-06 DIAGNOSIS — F32.A ANXIETY AND DEPRESSION: Primary | ICD-10-CM

## 2025-09-06 DIAGNOSIS — F41.9 ANXIETY AND DEPRESSION: Primary | ICD-10-CM

## 2025-09-06 RX ORDER — VENLAFAXINE HYDROCHLORIDE 75 MG/1
75 CAPSULE, EXTENDED RELEASE ORAL 2 TIMES DAILY
Qty: 60 CAPSULE | Refills: 3 | Status: SHIPPED | OUTPATIENT
Start: 2025-09-06